# Patient Record
Sex: FEMALE | Race: WHITE | NOT HISPANIC OR LATINO | Employment: FULL TIME | URBAN - METROPOLITAN AREA
[De-identification: names, ages, dates, MRNs, and addresses within clinical notes are randomized per-mention and may not be internally consistent; named-entity substitution may affect disease eponyms.]

---

## 2023-12-05 ASSESSMENT — ENCOUNTER SYMPTOMS
NECK PAIN: 0
SHORTNESS OF BREATH: 0
POLYPHAGIA: 0
EYE DISCHARGE: 0
FEVER: 0
TROUBLE SWALLOWING: 0
FATIGUE: 0
APPETITE CHANGE: 0
COUGH: 0
DYSURIA: 0
WOUND: 0
POLYDIPSIA: 0
VOMITING: 0
BRUISES/BLEEDS EASILY: 0
CONFUSION: 0
FREQUENCY: 0
BACK PAIN: 0
EYE PAIN: 0
BLOOD IN STOOL: 0
EYE REDNESS: 0
ADENOPATHY: 0
SORE THROAT: 0
HEMATURIA: 0
UNEXPECTED WEIGHT CHANGE: 0
DIZZINESS: 0
HALLUCINATIONS: 0
HEADACHES: 0
CHILLS: 0
PALPITATIONS: 0

## 2023-12-05 NOTE — PROGRESS NOTES
Subjective   Patient ID: Eleni Watt is a 43 y.o. female who presents for Annual Exam.    new pt-kate-last pcp, gi, ob gyn, others? [facility] , dr. Ewing- gyn   Put names of providers in care team-not last pcp  last physical- 4 years ago   last labs- 2 years ago   is pt fasting? no  Does the pt want a flu shot? No   *Last tdap- atleast 10 years for her last child does not remember getting it renewed. Not today  last pap 1/9/23  last mammo- never got     What ibs med did she used to be on? Does not remember   Does she have diarrhea mostly, constipation mostly or combination? Combination     Any other questions or concerns that you want to discuss today? No     Family history form          No care team member to display    HPI  Last labs-2yrs  Due for labs- all    Cholesterol   Date Value Ref Range Status   05/31/2019 139 0 - 199 mg/dL Final     Comment:     .      AGE      DESIRABLE   BORDERLINE HIGH   HIGH     0-19 Y     0 - 169       170 - 199     >/= 200    20-24 Y     0 - 189       190 - 224     >/= 225         >24 Y     0 - 199       200 - 239     >/= 240   **All ranges are based on fasting samples. Specific   therapeutic targets will vary based on patient-specific   cardiac risk.  .   Pediatric guidelines reference:Pediatrics 2011, 128(S5).   Adult guidelines reference: NCEP ATPIII Guidelines,     JOLEEN 2001, 258:2486-97  .   Venipuncture immediately after or during the    administration of Metamizole may lead to falsely   low results. Testing should be performed immediately   prior to Metamizole dosing.       Triglycerides   Date Value Ref Range Status   05/31/2019 43 0 - 149 mg/dL Final     Comment:     .      AGE      DESIRABLE   BORDERLINE HIGH   HIGH     VERY HIGH   0 D-90 D    19 - 174         ----         ----        ----  91 D- 9 Y     0 -  74        75 -  99     >/= 100      ----    10-19 Y     0 -  89        90 - 129     >/= 130      ----    20-24 Y     0 - 114       115 - 149      ">/= 150      ----         >24 Y     0 - 149       150 - 199    200- 499    >/= 500  .   Venipuncture immediately after or during the    administration of Metamizole may lead to falsely   low results. Testing should be performed immediately   prior to Metamizole dosing.       HDL   Date Value Ref Range Status   05/31/2019 62.1 mg/dL Final     Comment:     .      AGE      VERY LOW   LOW     NORMAL    HIGH       0-19 Y       < 35   < 40     40-45     ----    20-24 Y       ----   < 40       >45     ----      >24 Y       ----   < 40     40-60      >60  .       No results found for: \"LDL\"  TSH   Date Value Ref Range Status   05/31/2019 1.59 0.44 - 3.98 mIU/L Final     Comment:      TSH testing is performed using different testing    methodology at [facility] than at other    [facility] hospitals. Direct result comparisons should    only be made within the same method.  .   Patients receiving more than 5 mg/day of biotin may have interference   in test results.  A sample should be taken no sooner than eight hours   after  previous dose. Contact [phone] for additional information.       No results found for: \"A1C\"  No components found for: \"POCA1C\"  No results found for: \"ALBUR\"  No components found for: \"POCALBUR\"      Other concerns:What ibs med did she used to be on? Does not remember   Does she have diarrhea mostly, constipation mostly or combination? Combination   Stomach sour, not sure if related to allergies- more frequent than usual x 6mon  Did see gi dr in past but declined stomach scope  Ruq abd pain  Gas  No belching or bloating or bld in stool  No vomiting    bps at home- none    ER/urgicare visits in the last year- uti  Hospitalizations in the last year- none      last Pap- 1/9/23; due 1/2028  H/o abn pap-last yr    FH ovarian, endometrial, cervical, uterine ca-none    Current birth control method-none, had twins,  got vasectomy  No change in contraception desired      last mammo- " (40-75/90) due; has order    FH br ca-none      FH colon ca-none      Exercise- walk dog, does yoga  Diet-2 meals   Body mass index is 28.56 kg/m².    last eye dr appt- glasses;  2yrs  No vision issues    last dental appt- 2mon ago    BMs-regular to diarrhea or constipation  Sleep-able to fall asleep and stay asleep; no snoring or apnea  no cp, swelling, sob, abd pain, n/v/d/c, blood in stool or black stools  STI testing including hiv (age 15-65) and hep c screening (18-79)-declines        Immunization History   Administered Date(s) Administered    Moderna SARS-CoV-2 Vaccination 04/28/2021, 05/31/2021         fractures in lifetime-none      FH heart attack, heart surgery-mat aunt, mgm, mat gr gf  FH stroke-none    The ASCVD Risk score (Arturo SOMMERS, et al., 2019) failed to calculate for the following reasons:    Cannot find a previous HDL lab    Cannot find a previous total cholesterol lab    The smoking status is invalid  Coronary Artery Calcium score:  This test is recommended for men 45 or older and women 55 or older without a history of heart disease and have 1 risk factor (high LDL cholesterol, low HDL cholesterol, high blood pressure, smoker (current or past), type 2 diabetes, IBD, lupus, RA, ankylosing spondylitis, psoriasis or family history of  heart disease <55yrs in dad, brother or child or <65yrs in mom, sister, or child.)       Review of Systems   Constitutional:  Negative for appetite change, chills, fatigue, fever and unexpected weight change.   HENT:  Negative for congestion, ear pain, sore throat and trouble swallowing.    Eyes:  Negative for pain, discharge and redness.   Respiratory:  Negative for cough and shortness of breath.    Cardiovascular:  Negative for chest pain and palpitations.   Gastrointestinal:  Positive for abdominal pain. Negative for blood in stool and vomiting.        Gassiness   Endocrine: Negative for polydipsia, polyphagia and polyuria.   Genitourinary:  Negative for dysuria,  frequency, hematuria and urgency.   Musculoskeletal:  Negative for back pain and neck pain.   Skin:  Negative for rash and wound.   Allergic/Immunologic: Negative for immunocompromised state.   Neurological:  Negative for dizziness, syncope and headaches.   Hematological:  Negative for adenopathy. Does not bruise/bleed easily.   Psychiatric/Behavioral:  Negative for confusion and hallucinations.        Objective   Visit Vitals  /66   Pulse 97   Temp 36.6 °C (97.9 °F)      BP Readings from Last 3 Encounters:   12/06/23 103/66   01/09/23 120/76   11/06/22 116/84     Wt Readings from Last 3 Encounters:   12/06/23 87.7 kg (193 lb 6.4 oz)   01/09/23 81.2 kg (179 lb)   11/06/22 79.4 kg (175 lb)           Physical Exam  Constitutional:       General: She is not in acute distress.     Appearance: Normal appearance. She is not ill-appearing.   HENT:      Head: Normocephalic.      Right Ear: Tympanic membrane, ear canal and external ear normal.      Left Ear: Tympanic membrane, ear canal and external ear normal.      Nose: Nose normal.      Mouth/Throat:      Mouth: Mucous membranes are moist.      Pharynx: Oropharynx is clear.   Eyes:      Extraocular Movements: Extraocular movements intact.      Conjunctiva/sclera: Conjunctivae normal.      Pupils: Pupils are equal, round, and reactive to light.   Cardiovascular:      Rate and Rhythm: Normal rate and regular rhythm.      Heart sounds: Normal heart sounds. No murmur heard.  Pulmonary:      Effort: Pulmonary effort is normal. No respiratory distress.      Breath sounds: Normal breath sounds. No wheezing, rhonchi or rales.   Abdominal:      General: Bowel sounds are normal.      Palpations: Abdomen is soft. There is no mass.      Tenderness: There is abdominal tenderness (ruq).   Musculoskeletal:         General: No swelling or tenderness. Normal range of motion.      Cervical back: Normal range of motion and neck supple.      Right lower leg: No edema.      Left lower  leg: No edema.   Skin:     General: Skin is warm.      Findings: No rash.   Neurological:      General: No focal deficit present.      Mental Status: She is alert and oriented to person, place, and time.      Cranial Nerves: No cranial nerve deficit.      Motor: No weakness.   Psychiatric:         Mood and Affect: Mood normal.         Behavior: Behavior normal.         Assessment/Plan   Diagnoses and all orders for this visit:  Routine general medical examination at a health care facility  -     Comprehensive Metabolic Panel; Future  -     CBC and Auto Differential; Future  -     Lipid Panel; Future  -     TSH with reflex to Free T4 if abnormal; Future  BMI 28.0-28.9,adult  Irritable bowel syndrome with both constipation and diarrhea  -     dicyclomine (Bentyl) 20 mg tablet; Take 1 tablet (20 mg) by mouth 4 times a day as needed (abdominal pain or cramps).  Food allergy  -     Food Allergy Profile IgE; Future  Right upper quadrant abdominal pain  -     US biliary system; Future  Other orders  -     Follow Up In Primary Care - Health Maintenance; Future

## 2023-12-06 ENCOUNTER — OFFICE VISIT (OUTPATIENT)
Dept: PRIMARY CARE | Facility: CLINIC | Age: 43
End: 2023-12-06
Payer: COMMERCIAL

## 2023-12-06 VITALS
SYSTOLIC BLOOD PRESSURE: 103 MMHG | OXYGEN SATURATION: 97 % | HEIGHT: 69 IN | DIASTOLIC BLOOD PRESSURE: 66 MMHG | BODY MASS INDEX: 28.64 KG/M2 | TEMPERATURE: 97.9 F | HEART RATE: 97 BPM | WEIGHT: 193.4 LBS

## 2023-12-06 DIAGNOSIS — R10.11 RIGHT UPPER QUADRANT ABDOMINAL PAIN: ICD-10-CM

## 2023-12-06 DIAGNOSIS — Z00.00 ROUTINE GENERAL MEDICAL EXAMINATION AT A HEALTH CARE FACILITY: Primary | ICD-10-CM

## 2023-12-06 DIAGNOSIS — Z91.018 FOOD ALLERGY: ICD-10-CM

## 2023-12-06 DIAGNOSIS — K58.2 IRRITABLE BOWEL SYNDROME WITH BOTH CONSTIPATION AND DIARRHEA: ICD-10-CM

## 2023-12-06 PROCEDURE — 3008F BODY MASS INDEX DOCD: CPT | Performed by: NURSE PRACTITIONER

## 2023-12-06 PROCEDURE — 1036F TOBACCO NON-USER: CPT | Performed by: NURSE PRACTITIONER

## 2023-12-06 PROCEDURE — 99386 PREV VISIT NEW AGE 40-64: CPT | Performed by: NURSE PRACTITIONER

## 2023-12-06 RX ORDER — DICYCLOMINE HYDROCHLORIDE 20 MG/1
20 TABLET ORAL 4 TIMES DAILY PRN
Qty: 120 TABLET | Refills: 5 | Status: SHIPPED | OUTPATIENT
Start: 2023-12-06 | End: 2024-03-27 | Stop reason: WASHOUT

## 2023-12-06 RX ORDER — BISMUTH SUBSALICYLATE 262 MG
1 TABLET,CHEWABLE ORAL DAILY
COMMUNITY

## 2023-12-06 ASSESSMENT — ENCOUNTER SYMPTOMS
ROS GI COMMENTS: GASSINESS
ABDOMINAL PAIN: 1

## 2023-12-06 ASSESSMENT — PATIENT HEALTH QUESTIONNAIRE - PHQ9
2. FEELING DOWN, DEPRESSED OR HOPELESS: NOT AT ALL
SUM OF ALL RESPONSES TO PHQ9 QUESTIONS 1 AND 2: 0
1. LITTLE INTEREST OR PLEASURE IN DOING THINGS: NOT AT ALL

## 2023-12-06 NOTE — PATIENT INSTRUCTIONS
See dentist        Handouts given to pt:  physical handout      I recommend signing up for MyChart.    Labs:    You can use the lab in our building when fasting. The hrs are: Monday-Friday, 7 a.m. - 5 p.m., Saturday 8 a.m. - 12 noon.   No appt needed, BUT YOU DO NEED THE PAPER ORDER.    Fasting is no food, drink, gum or mints other than water for 12 hrs.   Results will be back in 2-3 business days for most labs. It is always recommended for any orders (labs, xrays, ultrasounds,MRI, ct scan, procedures etc) to check with your insurance provider for expected costs or expenses to you.         You will get your results via phone from my medical assistant if you do not have MyChart.  OR  You will get your results via [employer]    If a result is urgent, I will call to speak to you.    Vaccines:  ---- flu vaccine-declines    ---- Tdap-declines      General recommendations:  Exercise-cardio 4-5d/wk 30min each day  Diet-Breakfast-toast (my favorite Raine Coffman Delighful Multigrain or Chucho's Killer Bread Good Seed thin-sliced)/bagel/English muffin-whole wheat flour as a 1st ingredient or cereal/oatmeal/granola bar-fiber 4g or more or protein like eggs or peanut butter; optional veggies  Lunch-protein, 1/2c carb or 2 slices bread, veg 1c  Dinner-protein, fist sized carb, veg 1c  Fruit 2 a day  Dairy 2 a day-milk, soy milk, almond milk, cheese, yogurt, cottage cheese  Snacks-Protein-hard boiled egg, nuts (walnuts/almonds/pecans/pistachios 1/4c), hummus, beef/deer jerky or meat sticks; vegetable, fruit, dairy-milk(1%, skim, almond, soy)/cheese (not a lot of cheddar)/yogurt (Greek is best-my favorite Dannon Fruit on the Bottom Greek)/cottage cheese 2%; triscuits/ popcorn/wheat thins have a lot of fiber; follow serving size on bag/box/container  increase water  Limit alcohol to 1 drink per day for women and 2 drinks per day for men (1 drink=12oz beer or 5oz wine or 1 1/2oz liquor)  Calcium: 500mg 1 twice a day if age 50 and younger and  600mg 1 twice a day if over age 50 (calcium citrate can be taken without food)  Vitamin D: 800-5000 IU/day  Limit salt to <2300mg a day if age 50 and under and <1500mg a day if over age 50/have high bp or diabetes or kidney disease  Recommend folate for childbearing age women 0.4mg per day (can be found in a multivitamin)  Recommend 18mg/dL of iron a day if age 50 and under and 8mg/dL a day if over age 50; take on an empty stomach at bedtime  Use sunscreen   Wear seatbelt  Recommend safe sex practices: using condoms everytime you have sex, discuss with a new partner about their past partners/history of STDs/drug use, avoid drinking alcohol or using drugs as this increases the chance that you will participate in high-risk sex, for oral sex help protect your mouth by having your partner use a condom (male or female), women should not douche after sex, be aware of your partner's body and your body-look for signs of a sore, blister, rash, or discharge, and have regular exams and periodic tests for STDs.  No distracted driving  No driving when under influence of substances  Wear a seatbelt  Eye dr every 1-2yrs  Dentist every 6-12 mon  Tetanus shot every 10yrs  Recommend flu vaccine in the fall  Appt in 1 year for physical      I will communicate with you via [employer] regarding messages and results. If you need help with this, you can call the support line at [phone].    IT WAS A PLEASURE TO SEE YOU TODAY. THANK YOU FOR CHOOSING US FOR YOUR HEALTHCARE NEEDS.

## 2023-12-11 ENCOUNTER — LAB (OUTPATIENT)
Dept: LAB | Facility: LAB | Age: 43
End: 2023-12-11
Payer: COMMERCIAL

## 2023-12-11 DIAGNOSIS — Z91.018 FOOD ALLERGY: ICD-10-CM

## 2023-12-11 DIAGNOSIS — Z00.00 ROUTINE GENERAL MEDICAL EXAMINATION AT A HEALTH CARE FACILITY: ICD-10-CM

## 2023-12-11 LAB
ALBUMIN SERPL BCP-MCNC: 4.2 G/DL (ref 3.4–5)
ALP SERPL-CCNC: 40 U/L (ref 33–110)
ALT SERPL W P-5'-P-CCNC: 23 U/L (ref 7–45)
ANION GAP SERPL CALC-SCNC: 11 MMOL/L (ref 10–20)
AST SERPL W P-5'-P-CCNC: 15 U/L (ref 9–39)
BASOPHILS # BLD AUTO: 0.04 X10*3/UL (ref 0–0.1)
BASOPHILS NFR BLD AUTO: 0.7 %
BILIRUB SERPL-MCNC: 0.4 MG/DL (ref 0–1.2)
BUN SERPL-MCNC: 11 MG/DL (ref 6–23)
CALCIUM SERPL-MCNC: 9 MG/DL (ref 8.6–10.6)
CHLORIDE SERPL-SCNC: 105 MMOL/L (ref 98–107)
CHOLEST SERPL-MCNC: 143 MG/DL (ref 0–199)
CHOLESTEROL/HDL RATIO: 2.6
CO2 SERPL-SCNC: 27 MMOL/L (ref 21–32)
CREAT SERPL-MCNC: 0.91 MG/DL (ref 0.5–1.05)
EOSINOPHIL # BLD AUTO: 0.05 X10*3/UL (ref 0–0.7)
EOSINOPHIL NFR BLD AUTO: 0.8 %
ERYTHROCYTE [DISTWIDTH] IN BLOOD BY AUTOMATED COUNT: 11.6 % (ref 11.5–14.5)
GFR SERPL CREATININE-BSD FRML MDRD: 81 ML/MIN/1.73M*2
GLUCOSE SERPL-MCNC: 83 MG/DL (ref 74–99)
HCT VFR BLD AUTO: 39.8 % (ref 36–46)
HDLC SERPL-MCNC: 54.3 MG/DL
HGB BLD-MCNC: 13.5 G/DL (ref 12–16)
IMM GRANULOCYTES # BLD AUTO: 0.01 X10*3/UL (ref 0–0.7)
IMM GRANULOCYTES NFR BLD AUTO: 0.2 % (ref 0–0.9)
LDLC SERPL CALC-MCNC: 72 MG/DL
LYMPHOCYTES # BLD AUTO: 2.05 X10*3/UL (ref 1.2–4.8)
LYMPHOCYTES NFR BLD AUTO: 34.6 %
MCH RBC QN AUTO: 29.9 PG (ref 26–34)
MCHC RBC AUTO-ENTMCNC: 33.9 G/DL (ref 32–36)
MCV RBC AUTO: 88 FL (ref 80–100)
MONOCYTES # BLD AUTO: 0.47 X10*3/UL (ref 0.1–1)
MONOCYTES NFR BLD AUTO: 7.9 %
NEUTROPHILS # BLD AUTO: 3.3 X10*3/UL (ref 1.2–7.7)
NEUTROPHILS NFR BLD AUTO: 55.8 %
NON HDL CHOLESTEROL: 89 MG/DL (ref 0–149)
NRBC BLD-RTO: 0 /100 WBCS (ref 0–0)
PLATELET # BLD AUTO: 303 X10*3/UL (ref 150–450)
POTASSIUM SERPL-SCNC: 4.1 MMOL/L (ref 3.5–5.3)
PROT SERPL-MCNC: 6.8 G/DL (ref 6.4–8.2)
RBC # BLD AUTO: 4.52 X10*6/UL (ref 4–5.2)
SODIUM SERPL-SCNC: 139 MMOL/L (ref 136–145)
TRIGL SERPL-MCNC: 82 MG/DL (ref 0–149)
TSH SERPL-ACNC: 1.91 MIU/L (ref 0.44–3.98)
VLDL: 16 MG/DL (ref 0–40)
WBC # BLD AUTO: 5.9 X10*3/UL (ref 4.4–11.3)

## 2023-12-11 PROCEDURE — 84443 ASSAY THYROID STIM HORMONE: CPT

## 2023-12-11 PROCEDURE — 80053 COMPREHEN METABOLIC PANEL: CPT

## 2023-12-11 PROCEDURE — 80061 LIPID PANEL: CPT

## 2023-12-11 PROCEDURE — 36415 COLL VENOUS BLD VENIPUNCTURE: CPT

## 2023-12-11 PROCEDURE — 86003 ALLG SPEC IGE CRUDE XTRC EA: CPT

## 2023-12-11 PROCEDURE — 85025 COMPLETE CBC W/AUTO DIFF WBC: CPT

## 2023-12-12 ENCOUNTER — TELEPHONE (OUTPATIENT)
Dept: PRIMARY CARE | Facility: CLINIC | Age: 43
End: 2023-12-12
Payer: COMMERCIAL

## 2023-12-12 ENCOUNTER — ANCILLARY PROCEDURE (OUTPATIENT)
Dept: RADIOLOGY | Facility: CLINIC | Age: 43
End: 2023-12-12
Payer: COMMERCIAL

## 2023-12-12 DIAGNOSIS — R10.11 RIGHT UPPER QUADRANT PAIN: ICD-10-CM

## 2023-12-12 LAB
CLAM IGE QN: <0.1 KU/L
CODFISH IGE QN: <0.1 KU/L
CORN IGE QN: <0.1
EGG WHITE IGE QN: <0.1 KU/L
MILK IGE QN: <0.1 KU/L
PEANUT IGE QN: <0.1 KU/L
SCALLOP IGE QN: <0.1 KU/L
SESAME SEED IGE QN: <0.1 KU/L
SHRIMP IGE QN: <0.1 KU/L
SOYBEAN IGE QN: <0.1 KU/L
WALNUT IGE QN: <0.1 KU/L
WHEAT IGE QN: <0.1 KU/L

## 2023-12-12 PROCEDURE — 76705 ECHO EXAM OF ABDOMEN: CPT | Performed by: STUDENT IN AN ORGANIZED HEALTH CARE EDUCATION/TRAINING PROGRAM

## 2023-12-12 PROCEDURE — 76705 ECHO EXAM OF ABDOMEN: CPT

## 2023-12-12 NOTE — TELEPHONE ENCOUNTER
----- Message from MILTON Joe-CNP sent at 12/12/2023  9:54 AM EST -----  Sugar (aka glucose), kidney function, liver function and electrolytes in the CMP (comprehensive metabolic panel) were normal.  All cholesterol labs normal.  TSH (thyroid test) was normal.  CBC (complete blood count) was normal which looks at infection and anemia markers.

## 2023-12-18 ENCOUNTER — ANCILLARY PROCEDURE (OUTPATIENT)
Dept: RADIOLOGY | Facility: CLINIC | Age: 43
End: 2023-12-18
Payer: COMMERCIAL

## 2023-12-18 DIAGNOSIS — Z12.31 ENCOUNTER FOR SCREENING MAMMOGRAM FOR MALIGNANT NEOPLASM OF BREAST: ICD-10-CM

## 2023-12-18 PROCEDURE — 77067 SCR MAMMO BI INCL CAD: CPT | Mod: BILATERAL PROCEDURE | Performed by: RADIOLOGY

## 2023-12-18 PROCEDURE — 77063 BREAST TOMOSYNTHESIS BI: CPT | Mod: BILATERAL PROCEDURE | Performed by: RADIOLOGY

## 2023-12-18 PROCEDURE — 77067 SCR MAMMO BI INCL CAD: CPT

## 2024-01-16 ENCOUNTER — APPOINTMENT (OUTPATIENT)
Dept: OBSTETRICS AND GYNECOLOGY | Facility: CLINIC | Age: 44
End: 2024-01-16

## 2024-03-27 ENCOUNTER — OFFICE VISIT (OUTPATIENT)
Dept: OBSTETRICS AND GYNECOLOGY | Facility: CLINIC | Age: 44
End: 2024-03-27
Payer: COMMERCIAL

## 2024-03-27 VITALS
BODY MASS INDEX: 28.14 KG/M2 | SYSTOLIC BLOOD PRESSURE: 116 MMHG | HEIGHT: 69 IN | WEIGHT: 190 LBS | DIASTOLIC BLOOD PRESSURE: 78 MMHG

## 2024-03-27 DIAGNOSIS — Z12.31 ENCOUNTER FOR SCREENING MAMMOGRAM FOR BREAST CANCER: ICD-10-CM

## 2024-03-27 DIAGNOSIS — Z01.419 WELL WOMAN EXAM: ICD-10-CM

## 2024-03-27 DIAGNOSIS — N76.2 ACUTE VULVITIS: Primary | ICD-10-CM

## 2024-03-27 PROCEDURE — 3008F BODY MASS INDEX DOCD: CPT | Performed by: OBSTETRICS & GYNECOLOGY

## 2024-03-27 PROCEDURE — 1036F TOBACCO NON-USER: CPT | Performed by: OBSTETRICS & GYNECOLOGY

## 2024-03-27 PROCEDURE — 88175 CYTOPATH C/V AUTO FLUID REDO: CPT

## 2024-03-27 PROCEDURE — 87624 HPV HI-RISK TYP POOLED RSLT: CPT

## 2024-03-27 PROCEDURE — 99396 PREV VISIT EST AGE 40-64: CPT | Performed by: OBSTETRICS & GYNECOLOGY

## 2024-03-27 RX ORDER — CLOTRIMAZOLE AND BETAMETHASONE DIPROPIONATE 10; .64 MG/G; MG/G
1 CREAM TOPICAL 2 TIMES DAILY
Qty: 30 G | Refills: 0 | Status: SHIPPED | OUTPATIENT
Start: 2024-03-27 | End: 2024-05-26

## 2024-03-27 ASSESSMENT — PAIN SCALES - GENERAL: PAINLEVEL: 0-NO PAIN

## 2024-03-27 NOTE — PROGRESS NOTES
"Chief Complaint   Patient presents with    Well Women Visit     PT is here for Annual.     Patient presents for annual preventative exam.  Overall doing well.  Over the last week noticed a \"sore\" left lower labia.  Has been with a history of HPV and wants to be sure everything here is fine.  Children are 12, 11, 11.    REVIEW OF SYSTEMS    Please see HPI for reported pertinent positives, which would supersede this ROS    Constitutional:  Denies fever, chills, wt gain or loss, fatigue    Genito-Urinary:  Denies genital lesion or sores, vaginal dryness, itching  or pain.  No abnormal vaginal discharge or unexplained vaginal bleeding.  No dysuria, urinary incontinence or frequency.  Denies pelvic pain, dysmenorrhea or dyspareunia.    Eyes:  Denies vision changes, dryness  ENT:  No hearing loss, sinus pain or congestion, nosebleeds  Cardiovascular:  No chest pain or palpitations  Respiratory:  No SOB, cough, wheezing  GI:  No Nausea, vomiting, diarrhea, constipation, abdominal pain  Musculoskeletal:  No new back pain. joint pain, peripheral edema  Skin:  No rash or skin lesion  Neurologic:  No HA, numbness or dizziness  Psychiatric:  No new anxiety or depression  Endocrine:  No loss of hair or hirsutism  Hematologic/lymphatic:  No swollen lymph nodes.  No reported tendency for easy bruising or bleeding    Patient admits to no other systemic complaints      Vitals:    03/27/24 1044   BP: 116/78       PHYSICAL EXAM:    PSYCH:  Pt is alert, oriented and cooperative    SKIN: warm, dry, w/o lesion    HEAD AND FACE:  External inspection of eyes, ears, functional cranial nerves normal and intact    THYROID:  No thyromegaly    CARDIOVASCULAR:  Warm and well Perfused    PULMONARY:  No respiratory distress    ABDOMEN:  soft, nontender.  No mass or organomegaly.      BREAST:  Breasts are symmetric to inspection and palpation.  No mass palpable bilaterally.  There is no axillary lymphadenopathy    PELVIC:    External genitalia, " urethra, perianal region normal to inspection and palpation if indicated.  No inguinal LA    Lower left confluence of the minora just left of perineal body there is a 8 x 10 mm area of inflamed tissue.  This is bright pink with a small fissure.  No evidence of condyloma or HSV.  Suspect localized inflammation, possibly due to contact sensitivity, possible yeast.    Vagina without lesions.    Cervix seen and visually normal      Bimanual exam:      No pelvic mass palpable    Uterus nontender, midline in pelvis    No adnexal masses or tenderness    Assessment/Plan    Diagnoses and all orders for this visit:  Well woman exam  -     THINPREP PAP TEST  Encounter for screening mammogram for breast cancer  Localized vulvitis.  Lotrisone cream topically twice daily for 1 to 2 weeks.  Let me know if not resolved.

## 2024-04-01 ENCOUNTER — HOSPITAL ENCOUNTER (OUTPATIENT)
Dept: RADIOLOGY | Facility: EXTERNAL LOCATION | Age: 44
Discharge: HOME | End: 2024-04-01

## 2024-04-01 ENCOUNTER — OFFICE VISIT (OUTPATIENT)
Dept: URGENT CARE | Facility: CLINIC | Age: 44
End: 2024-04-01
Payer: COMMERCIAL

## 2024-04-01 VITALS
HEART RATE: 85 BPM | RESPIRATION RATE: 18 BRPM | BODY MASS INDEX: 28.06 KG/M2 | DIASTOLIC BLOOD PRESSURE: 80 MMHG | TEMPERATURE: 97.9 F | SYSTOLIC BLOOD PRESSURE: 112 MMHG | WEIGHT: 190 LBS | OXYGEN SATURATION: 97 %

## 2024-04-01 DIAGNOSIS — S89.91XA LOWER EXTREMITY INJURY, RIGHT, INITIAL ENCOUNTER: Primary | ICD-10-CM

## 2024-04-01 DIAGNOSIS — S82.64XA CLOSED NONDISPLACED FRACTURE OF LATERAL MALLEOLUS OF RIGHT FIBULA, INITIAL ENCOUNTER: ICD-10-CM

## 2024-04-01 DIAGNOSIS — S89.91XA LOWER EXTREMITY INJURY, RIGHT, INITIAL ENCOUNTER: ICD-10-CM

## 2024-04-01 PROCEDURE — 3008F BODY MASS INDEX DOCD: CPT | Performed by: PHYSICIAN ASSISTANT

## 2024-04-01 PROCEDURE — 99204 OFFICE O/P NEW MOD 45 MIN: CPT | Performed by: PHYSICIAN ASSISTANT

## 2024-04-01 PROCEDURE — 1036F TOBACCO NON-USER: CPT | Performed by: PHYSICIAN ASSISTANT

## 2024-04-01 ASSESSMENT — ENCOUNTER SYMPTOMS
ENDOCRINE NEGATIVE: 1
GASTROINTESTINAL NEGATIVE: 1
CONSTITUTIONAL NEGATIVE: 1
ARTHRALGIAS: 1
PSYCHIATRIC NEGATIVE: 1
CARDIOVASCULAR NEGATIVE: 1
NEUROLOGICAL NEGATIVE: 1
ALLERGIC/IMMUNOLOGIC NEGATIVE: 1
HEMATOLOGIC/LYMPHATIC NEGATIVE: 1
EYES NEGATIVE: 1
RESPIRATORY NEGATIVE: 1

## 2024-04-01 NOTE — PATIENT INSTRUCTIONS
Use your walker boot till seen by orthopedics  Motrin or aleve as directed  Ice and elevate ankle 2-3 times a day  Orthopedic follow up this week

## 2024-04-01 NOTE — PROGRESS NOTES
Subjective   Patient ID: Eleni Watt is a 44 y.o. female.      History provided by:  Patient   used: No    Ankle Injury    This is a 44 yr old female here for right foot and ankle injury 2 days ago. Rolled the right LE working out in yard. No parasthesias or open wound.     Review of Systems   Constitutional: Negative.    HENT: Negative.     Eyes: Negative.    Respiratory: Negative.     Cardiovascular: Negative.    Gastrointestinal: Negative.    Endocrine: Negative.    Genitourinary: Negative.    Musculoskeletal:  Positive for arthralgias.   Skin: Negative.    Allergic/Immunologic: Negative.    Neurological: Negative.    Hematological: Negative.    Psychiatric/Behavioral: Negative.     All other systems reviewed and are negative.  /80   Pulse 85   Temp 36.6 °C (97.9 °F)   Resp 18   Wt 86.2 kg (190 lb)   LMP 01/16/2024   SpO2 97%   BMI 28.06 kg/m²     Objective   Physical Exam  Vitals and nursing note reviewed.   Constitutional:       Appearance: Normal appearance.   HENT:      Head: Normocephalic and atraumatic.   Cardiovascular:      Rate and Rhythm: Normal rate and regular rhythm.   Pulmonary:      Effort: Pulmonary effort is normal.      Breath sounds: Normal breath sounds.   Musculoskeletal:      Comments: Right LE-lateral pain with palpation over malleolus, ecchymosis and edema present into foot, limited FROM, distal n-v intact   Skin:     General: Skin is warm and dry.   Neurological:      General: No focal deficit present.      Mental Status: She is alert and oriented to person, place, and time.   Psychiatric:         Mood and Affect: Mood normal.         Behavior: Behavior normal.     Assessment:  Distal fibula fracture right LE    Plan:  Right foot/ankle xray-distal fibula fx  Has walking boot to use till seen by orthopedics  OTC NSAID as directed  Ice and elevate ankle/foot 2-3 times a day  Orthopedic follow up this week  ER visit anytime 24/7 for acute worsening or  changing condition

## 2024-04-02 ENCOUNTER — HOSPITAL ENCOUNTER (OUTPATIENT)
Dept: RADIOLOGY | Facility: HOSPITAL | Age: 44
Discharge: HOME | End: 2024-04-02
Payer: COMMERCIAL

## 2024-04-02 ENCOUNTER — OFFICE VISIT (OUTPATIENT)
Dept: ORTHOPEDIC SURGERY | Facility: HOSPITAL | Age: 44
End: 2024-04-02
Payer: COMMERCIAL

## 2024-04-02 VITALS — WEIGHT: 190 LBS | HEIGHT: 69 IN | BODY MASS INDEX: 28.14 KG/M2

## 2024-04-02 DIAGNOSIS — S82.891A ANKLE FRACTURE, RIGHT, CLOSED, INITIAL ENCOUNTER: ICD-10-CM

## 2024-04-02 DIAGNOSIS — S82.61XA CLOSED FRACTURE OF DISTAL LATERAL MALLEOLUS OF ANKLE, RIGHT, INITIAL ENCOUNTER: Primary | ICD-10-CM

## 2024-04-02 PROCEDURE — 27786 TREATMENT OF ANKLE FRACTURE: CPT | Performed by: ORTHOPAEDIC SURGERY

## 2024-04-02 PROCEDURE — 73590 X-RAY EXAM OF LOWER LEG: CPT | Mod: RIGHT SIDE | Performed by: RADIOLOGY

## 2024-04-02 PROCEDURE — 73590 X-RAY EXAM OF LOWER LEG: CPT | Mod: RT

## 2024-04-02 PROCEDURE — 99203 OFFICE O/P NEW LOW 30 MIN: CPT | Performed by: ORTHOPAEDIC SURGERY

## 2024-04-02 PROCEDURE — 99213 OFFICE O/P EST LOW 20 MIN: CPT | Mod: 57,25 | Performed by: ORTHOPAEDIC SURGERY

## 2024-04-02 PROCEDURE — 3008F BODY MASS INDEX DOCD: CPT | Performed by: ORTHOPAEDIC SURGERY

## 2024-04-02 PROCEDURE — 1036F TOBACCO NON-USER: CPT | Performed by: ORTHOPAEDIC SURGERY

## 2024-04-02 ASSESSMENT — PAIN DESCRIPTION - DESCRIPTORS: DESCRIPTORS: SORE

## 2024-04-02 ASSESSMENT — PAIN SCALES - GENERAL: PAINLEVEL_OUTOF10: 4

## 2024-04-02 ASSESSMENT — PAIN - FUNCTIONAL ASSESSMENT: PAIN_FUNCTIONAL_ASSESSMENT: 0-10

## 2024-04-02 NOTE — PROGRESS NOTES
ORTHOPAEDIC HISTORY AND PHYSICAL    History Of Present Illness  Orthopaedic Problems/Injuries:  Eleni Watt is a 44 y.o. female presenting with right ankle fracture.  She reported last Saturday she tripped landing awkwardly and twisting her ankle severely.  She had immediate pain and swelling.  She was in the emergency room and noted to have a fracture of her ankle.  Patient but cam boot from [facility] and is back to working.  She is able to ambulate in the boot.  She have pain and swelling and ecchymosis on the ankle and foot.  Pain is moderate.  The pain is worse with movement and better with rest.  She rates her pain as 6 out of 10.  No numbness or tingling the foot.      Review of Systems: 12 point ROS negative unless stated in HPI    Past Medical History  She has a past medical history of Gastro-esophageal reflux disease without esophagitis (10/23/2019).    Surgical History  She has a past surgical history that includes Other surgical history (10/23/2019).     Social History  She reports that she quit smoking about 13 years ago. Her smoking use included cigarettes. She has never used smokeless tobacco. She reports that she does not drink alcohol and does not use drugs.    Family History  Family History   Problem Relation Name Age of Onset    Heart disease Maternal Grandmother          Allergies  Sulfamethoxazole-trimethoprim    Review of Systems     Physical Exam  Gen: The patient is alert and oriented ×3, is in no acute distress, and appear their stated age and weight.    Psychiatric: Mood and affect are appropriate.    Eyes: Sclera are white, and pupils are round and symmetric.    ENT: Mucous membranes are moist.     Neck: Supple. Thyroid is midline.    Respiratory: Respirations are nonlabored, chest rise is symmetric.    Cardiac: Rate is regular by palpation of distal pulses.     Abdomen: Nondistended.    Integument: No obvious cutaneous lesions are noted. No signs of lymphangitis. No signs of systemic  edema.    Examination right ankle was performed.  There was swelling, ecchymosis and bruising over the lateral malleolus.  The skin and neurovascular examination of the foot was intact. The neurological exam including motor and sensory exam was performed. The vascular examination including palpation of pulses and capillary refill of the foot was performed and determined to be intact.   There was tenderness to palpation over the lateral malleolus.  There was no gross instability of the foot or ankle.  There was no lymphangitis.  Subtalar and midtarsal motion was intact.  Jimenez test was negative for Achilles tendon rupture.  Calf was soft, nontender and there were no palpable cords.        Relevant Results  I personally reviewed right ankle radiograph that reveals a distal fibula avulsion fracture.  Alignment is well-maintained with some displacement.  Ankle mortise is maintained.    Assessment/Plan   Patient sustained a Woodard a lateral malleolus ankle fracture.  Good alignment.  We discussed treatment option.  I recommended close treatment of her fracture.  We discussed risk of malunion and injury to the articular surface of the talus with this fracture pattern.  Patient may be weightbearing as tolerated in the cam boot.  The boot that she purchased was a very short boot essentially stopped supramalleolar.  I showed her a tall cam boot and recommended that she purchase his boot.  I recommend using the boot for at least 6 weeks.  I gave her the option to go to physical therapy however she declined.  I demonstrated home exercises to her and regimen/protocol.  Patient will come back and see me in about 2 to 3 months for repeat radiographs to ensure that the fracture is healed.  She may take over-the-counter anti-inflammatories as needed.

## 2024-08-20 ASSESSMENT — ENCOUNTER SYMPTOMS
SHORTNESS OF BREATH: 0
WHEEZING: 0
CONSTITUTIONAL NEGATIVE: 1

## 2024-08-20 NOTE — PROGRESS NOTES
Subjective   Patient ID: Eleni Watt is a 44 y.o. female who presents for UTI.  Last physical: 12/6/23; needs to schedule for next appt  Last labs-12/2023  Sugar (aka glucose), kidney function, liver function and electrolytes in the CMP (comprehensive metabolic panel) were normal.  All cholesterol labs normal.  TSH (thyroid test) was normal.  CBC (complete blood count) was normal which looks at infection and anemia markers.  Food allergy tests are normal.  I can refer you to an allergist or gi dr if you would like to have further evaluation. Let me know.    When did uti sx start? 3 days ago   What are her symptoms? Pressure, irritation when urinating and after, urgency, frequency.   Any other questions or concerns that pt wants to discuss today? No     Poc ua, send culture    HPI  uti symptoms for 3d  Lower abd pressure, irritation when urinating and after, urgency, frequency.   No hematuria,  lower back pain, fever, chills, cloudy urine, odor to urine, small amount of urine when urinate, nausea, vomiting, incontinence  no itching, skin feels like it is burning, redness, rash, thick (thin or watery) vaginal d/c, irritation of genital area, swelling of genital area, painful IC  no fishy vaginal odor especially after IC or thin whitish-gray vaginal d/c  Selftxt: water      No care team member to display     Review of Systems   Constitutional: Negative.    Respiratory:  Negative for shortness of breath and wheezing.    Cardiovascular:  Negative for chest pain.       Objective   Visit Vitals  /75   Pulse 76   Temp 36.1 °C (96.9 °F)      BP Readings from Last 3 Encounters:   08/21/24 106/75   04/01/24 112/80   03/27/24 116/78     Wt Readings from Last 3 Encounters:   08/21/24 85.9 kg (189 lb 6.4 oz)   04/02/24 86.2 kg (190 lb)   04/01/24 86.2 kg (190 lb)       Physical Exam  Constitutional:       General: She is not in acute distress.     Appearance: Normal appearance.   Neurological:      Mental Status: She  is alert.       Assessment/Plan   Diagnoses and all orders for this visit:  Urinary urgency  -     Urine Culture; Future  -     POCT UA Automated manually resulted; Future  -     nitrofurantoin, macrocrystal-monohydrate, (Macrobid) 100 mg capsule; Take 1 capsule (100 mg) by mouth 2 times a day for 7 days.  Other orders  -     Follow Up In Primary Care - Health Maintenance; Future

## 2024-08-21 ENCOUNTER — OFFICE VISIT (OUTPATIENT)
Dept: PRIMARY CARE | Facility: CLINIC | Age: 44
End: 2024-08-21
Payer: COMMERCIAL

## 2024-08-21 VITALS
WEIGHT: 189.4 LBS | SYSTOLIC BLOOD PRESSURE: 106 MMHG | HEIGHT: 69 IN | BODY MASS INDEX: 28.05 KG/M2 | TEMPERATURE: 96.9 F | OXYGEN SATURATION: 97 % | DIASTOLIC BLOOD PRESSURE: 75 MMHG | HEART RATE: 76 BPM

## 2024-08-21 DIAGNOSIS — R39.15 URINARY URGENCY: Primary | ICD-10-CM

## 2024-08-21 LAB
POC APPEARANCE, URINE: CLEAR
POC BILIRUBIN, URINE: NEGATIVE
POC BLOOD, URINE: NEGATIVE
POC COLOR, URINE: YELLOW
POC GLUCOSE, URINE: NEGATIVE MG/DL
POC KETONES, URINE: NEGATIVE MG/DL
POC LEUKOCYTES, URINE: ABNORMAL
POC NITRITE,URINE: NEGATIVE
POC PH, URINE: 6 PH
POC PROTEIN, URINE: NEGATIVE MG/DL
POC SPECIFIC GRAVITY, URINE: 1.01
POC UROBILINOGEN, URINE: 0.2 EU/DL

## 2024-08-21 PROCEDURE — 99213 OFFICE O/P EST LOW 20 MIN: CPT | Performed by: NURSE PRACTITIONER

## 2024-08-21 PROCEDURE — 1036F TOBACCO NON-USER: CPT | Performed by: NURSE PRACTITIONER

## 2024-08-21 PROCEDURE — 81003 URINALYSIS AUTO W/O SCOPE: CPT | Performed by: NURSE PRACTITIONER

## 2024-08-21 PROCEDURE — 3008F BODY MASS INDEX DOCD: CPT | Performed by: NURSE PRACTITIONER

## 2024-08-21 PROCEDURE — 87086 URINE CULTURE/COLONY COUNT: CPT

## 2024-08-21 RX ORDER — NITROFURANTOIN 25; 75 MG/1; MG/1
100 CAPSULE ORAL 2 TIMES DAILY
Qty: 14 CAPSULE | Refills: 0 | Status: SHIPPED | OUTPATIENT
Start: 2024-08-21 | End: 2024-08-28

## 2024-08-21 ASSESSMENT — PATIENT HEALTH QUESTIONNAIRE - PHQ9
2. FEELING DOWN, DEPRESSED OR HOPELESS: NOT AT ALL
1. LITTLE INTEREST OR PLEASURE IN DOING THINGS: NOT AT ALL
SUM OF ALL RESPONSES TO PHQ9 QUESTIONS 1 AND 2: 0

## 2024-08-21 NOTE — PATIENT INSTRUCTIONS
urine culture result back in 2-3d  push fluids; can try cranberry juice  avoid drinks that irritate the bladder like coffee, alcohol, and soft drinks containing citrus juices or caffeine until your infection has cleared.   rest  tylenol  ibuprofen  heating pad on abdomen  start antibiotic; you can stop the antibiotic if the culture comes back negative/normal.  call if sx worsen or change especially fever, chills, or back pain or not starting to get better in 2-3d        I will communicate with you via [email or web address] regarding messages and results. If you need help with this, you can call the support line at [phone].    IT WAS A PLEASURE TO SEE YOU TODAY. THANK YOU FOR CHOOSING US FOR YOUR HEALTHCARE NEEDS.

## 2024-08-22 LAB — BACTERIA UR CULT: NORMAL

## 2024-08-23 DIAGNOSIS — R39.15 URINARY URGENCY: Primary | ICD-10-CM

## 2024-11-08 ENCOUNTER — OFFICE VISIT (OUTPATIENT)
Dept: ORTHOPEDIC SURGERY | Facility: CLINIC | Age: 44
End: 2024-11-08
Payer: COMMERCIAL

## 2024-11-08 ENCOUNTER — HOSPITAL ENCOUNTER (OUTPATIENT)
Dept: RADIOLOGY | Facility: CLINIC | Age: 44
Discharge: HOME | End: 2024-11-08
Payer: COMMERCIAL

## 2024-11-08 VITALS — WEIGHT: 180 LBS | BODY MASS INDEX: 26.66 KG/M2 | HEIGHT: 69 IN

## 2024-11-08 DIAGNOSIS — M72.2 PLANTAR FASCIITIS OF RIGHT FOOT: Primary | ICD-10-CM

## 2024-11-08 DIAGNOSIS — M25.571 ACUTE RIGHT ANKLE PAIN: ICD-10-CM

## 2024-11-08 PROCEDURE — 73610 X-RAY EXAM OF ANKLE: CPT | Mod: RT

## 2024-11-08 PROCEDURE — 99203 OFFICE O/P NEW LOW 30 MIN: CPT

## 2024-11-08 PROCEDURE — 99213 OFFICE O/P EST LOW 20 MIN: CPT

## 2024-11-08 PROCEDURE — 3008F BODY MASS INDEX DOCD: CPT

## 2024-11-08 PROCEDURE — 1036F TOBACCO NON-USER: CPT

## 2024-11-08 RX ORDER — METHYLPREDNISOLONE 4 MG/1
TABLET ORAL
Qty: 21 TABLET | Refills: 0 | Status: SHIPPED | OUTPATIENT
Start: 2024-11-08

## 2024-11-08 NOTE — PROGRESS NOTES
Subjective    Patient ID: Eleni Watt is a 44 y.o. female.    Chief Complaint: Follow-up of the Right Ankle (FUV for ankle fx)    HPI  This is a pleasant 44-year-old female presenting to the office for evaluation of continued right ankle pain.  Patient explains that she initially injured her right ankle in March 2024, when she sustained a distal fibula fracture, which was treated nonoperatively.  States that she wore walking boot for approximately 3 weeks and then transition into a lace up ankle brace.  States that she was recovering well when unfortunately 2 months ago she again rolled her right ankle.  States that pain returned in her ankle, but was more focal to the medial aspect of her right foot and ankle.  Over the last few weeks, she has noticed medial ankle pain and swelling as well as pain to the medial dorsal and plantar aspect of her foot.  She has been using an Ace wrap, taking NSAIDs and applying ice with some relief of symptoms.  Pain is worse with any prolonged walking standing or stair climbing.  Patient does state that she has some degree of plantar fasciitis.  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    The patient's past medical, surgical, family, and social history as well as allergies and medications were reviewed and updated in the chart.    Objective   Ortho Exam  Pleasant in no acute distress.  Walks in the office today with a normal gait, no use of assistive device.  Right foot and ankle are appearing without soft tissue swelling erythema or ecchymosis.  There is no warmth upon touch and skin is intact.  She is minimally tender upon palpation of medial dorsal and plantar foot.  No specific tenderness upon palpation of plantar fascia.  No tenderness to medial or lateral malleolus.  Minimal tenderness to deltoid ligament.  She has full range of motion of her right foot and ankle with minimal pain elicited.  She has adequate strength with resisted dorsiflexion  and plantarflexion as well as inversion and eversion.  The ankle joint is stable, no ligament laxity.  Sensation is intact to light touch.    Image Results:  XR tibia fibula right 2 views  Narrative: Interpreted By:  Stew Frederick  and Justus Vogt   STUDY:  Right tibia, two views      INDICATION:  Signs/Symptoms:distal fibulat fx.      COMPARISON:  None.      ACCESSION NUMBER(S):  [id number]      ORDERING CLINICIAN:  ОЛЬГА FOWLER      FINDINGS:  Acute minimally displaced avulsion fracture of the lateral malleolus  distal tip. The ankle mortise is maintained.  No significant degenerative changes.  No ankle joint effusion.  No radiopaque retained foreign body or soft tissue gas.      Impression: Acute minimally displaced avulsion fracture of the lateral malleolus  distal tip.      I personally reviewed the image(s)/study and resident interpretation.  I agree with the findings as stated by resident Sin Jerome.  Data analyzed and images interpreted at [facility], [place], [place].      MACRO:  None.      Signed by: Stew Frederick 4/3/2024 9:59 AM  Dictation workstation:   [id number]    Multiple view x-rays of the right ankle obtained today personally reviewed, with evidence of a remote injury to distal tip of lateral malleolus.  There is no acute fracture or dislocation noted.  Ankle mortise is intact on the pain.    Assessment/Plan   Encounter Diagnoses:  Plantar fasciitis of right foot    Acute right ankle pain    Plan: Discussion with patient in regards to recurrent ankle sprains in continued right foot and ankle pain, mostly focal to the medial side.  Conservative treatment options were discussed at length.  I did advise that patient use a lace up ankle brace or Ace wrap to help with ankle stability and comfort.  She will benefit from a formal physical therapy program to help strengthen right ankle referral provided.  I did prescribe patient a Medrol Dosepak  to help decrease pain and inflammation of right foot and ankle.  If she does not see significant relief of symptoms following conservative treatment options, referral to a orthopedic foot and ankle specialist would be indicated.  She can follow-up as needed.    Orders Placed This Encounter    XR ankle right 3+ views    Referral to Physical Therapy    methylPREDNISolone (Medrol Dospak) 4 mg tablets

## 2024-12-04 ENCOUNTER — APPOINTMENT (OUTPATIENT)
Dept: PHYSICAL THERAPY | Facility: CLINIC | Age: 44
End: 2024-12-04
Payer: COMMERCIAL

## 2024-12-09 ENCOUNTER — OFFICE VISIT (OUTPATIENT)
Dept: URGENT CARE | Age: 44
End: 2024-12-09
Payer: COMMERCIAL

## 2024-12-09 VITALS
HEART RATE: 90 BPM | DIASTOLIC BLOOD PRESSURE: 72 MMHG | BODY MASS INDEX: 27.32 KG/M2 | OXYGEN SATURATION: 100 % | SYSTOLIC BLOOD PRESSURE: 100 MMHG | RESPIRATION RATE: 18 BRPM | WEIGHT: 185 LBS | TEMPERATURE: 97.2 F

## 2024-12-09 DIAGNOSIS — J06.9 UPPER RESPIRATORY TRACT INFECTION, UNSPECIFIED TYPE: Primary | ICD-10-CM

## 2024-12-09 PROCEDURE — 99213 OFFICE O/P EST LOW 20 MIN: CPT | Performed by: FAMILY MEDICINE

## 2024-12-09 PROCEDURE — 1036F TOBACCO NON-USER: CPT | Performed by: FAMILY MEDICINE

## 2024-12-09 ASSESSMENT — ENCOUNTER SYMPTOMS
SINUS PAIN: 0
EYE PAIN: 0
WHEEZING: 1
SHORTNESS OF BREATH: 1
CHILLS: 0
DIAPHORESIS: 1
SINUS PRESSURE: 0
EYE DISCHARGE: 0
SORE THROAT: 1
CHEST TIGHTNESS: 1
FEVER: 0
EYE REDNESS: 0
HEADACHES: 1
COUGH: 1

## 2024-12-09 NOTE — PROGRESS NOTES
Subjective   Patient ID: Eleni Watt is a 44 y.o. female. They present today with a chief complaint of Cough (X 1 week).    History of Present Illness    History provided by:  Patient   used: No    Cough  This is a new problem. The current episode started in the past 7 days (12/6/24). The problem has been unchanged. The problem occurs every few minutes. The cough is Productive of sputum. Associated symptoms include ear pain, headaches, a sore throat, shortness of breath and wheezing. Pertinent negatives include no chest pain, chills, eye redness or fever. She has tried OTC cough suppressant (Tylenol and Advil) for the symptoms. The treatment provided no relief.       Past Medical History  Allergies as of 12/09/2024 - Reviewed 12/09/2024   Allergen Reaction Noted    Sulfamethoxazole-trimethoprim Nausea/vomiting 12/06/2023       (Not in a hospital admission)       Past Medical History:   Diagnosis Date    Gastro-esophageal reflux disease without esophagitis 10/23/2019    GERD (gastroesophageal reflux disease)       Past Surgical History:   Procedure Laterality Date    OTHER SURGICAL HISTORY  10/23/2019    No history of surgery        reports that she quit smoking about 13 years ago. Her smoking use included cigarettes. She has never used smokeless tobacco. She reports that she does not drink alcohol and does not use drugs.    Review of Systems  Review of Systems   Constitutional:  Positive for diaphoresis. Negative for chills and fever.   HENT:  Positive for ear pain and sore throat. Negative for sinus pressure and sinus pain.    Eyes:  Negative for pain, discharge and redness.   Respiratory:  Positive for cough, chest tightness, shortness of breath and wheezing.    Cardiovascular:  Negative for chest pain.   Neurological:  Positive for headaches.                                  Objective    Vitals:    12/09/24 0835   BP: 100/72   Pulse: 90   Resp: 18   Temp: 36.2 °C (97.2 °F)   SpO2: 100%    Weight: 83.9 kg (185 lb)     No LMP recorded.    Physical Exam  Vitals reviewed.   Constitutional:       General: She is not in acute distress.     Appearance: She is not ill-appearing.   HENT:      Right Ear: Tympanic membrane and ear canal normal.      Left Ear: Tympanic membrane and ear canal normal.      Nose: Nose normal.      Right Sinus: No maxillary sinus tenderness or frontal sinus tenderness.      Left Sinus: No maxillary sinus tenderness or frontal sinus tenderness.      Mouth/Throat:      Mouth: Mucous membranes are moist.      Pharynx: Posterior oropharyngeal erythema present. No oropharyngeal exudate.   Eyes:      Extraocular Movements: Extraocular movements intact.      Conjunctiva/sclera: Conjunctivae normal.      Pupils: Pupils are equal, round, and reactive to light.   Cardiovascular:      Rate and Rhythm: Normal rate and regular rhythm.      Heart sounds: No murmur heard.     No friction rub.   Pulmonary:      Effort: Pulmonary effort is normal. No respiratory distress.      Breath sounds: No wheezing, rhonchi or rales.   Lymphadenopathy:      Cervical: No cervical adenopathy.   Neurological:      Mental Status: She is alert.         Procedures    Point of Care Test & Imaging Results from this visit  No results found for this visit on 12/09/24.   No results found.    Diagnostic study results (if any) were reviewed by Devon Orozco DO.    Assessment/Plan   Allergies, medications, history, and pertinent labs/EKGs/Imaging reviewed by Devon Orozco DO.     Orders and Diagnoses  There are no diagnoses linked to this encounter.    Medical Admin Record      Patient disposition: Home    Electronically signed by Devon Orozco DO  8:45 AM

## 2024-12-26 ENCOUNTER — OFFICE VISIT (OUTPATIENT)
Dept: PRIMARY CARE | Facility: CLINIC | Age: 44
End: 2024-12-26
Payer: COMMERCIAL

## 2024-12-26 VITALS
DIASTOLIC BLOOD PRESSURE: 47 MMHG | WEIGHT: 190.8 LBS | HEART RATE: 113 BPM | BODY MASS INDEX: 28.26 KG/M2 | TEMPERATURE: 97.3 F | OXYGEN SATURATION: 96 % | SYSTOLIC BLOOD PRESSURE: 101 MMHG | HEIGHT: 69 IN

## 2024-12-26 DIAGNOSIS — U07.1 COVID-19: Primary | ICD-10-CM

## 2024-12-26 DIAGNOSIS — R05.1 ACUTE COUGH: ICD-10-CM

## 2024-12-26 LAB
POC RAPID INFLUENZA A: NEGATIVE
POC RAPID INFLUENZA B: NEGATIVE
POC SARS-COV-2 AG BINAX: ABNORMAL

## 2024-12-26 PROCEDURE — 87811 SARS-COV-2 COVID19 W/OPTIC: CPT | Performed by: FAMILY MEDICINE

## 2024-12-26 PROCEDURE — 1036F TOBACCO NON-USER: CPT | Performed by: FAMILY MEDICINE

## 2024-12-26 PROCEDURE — 3008F BODY MASS INDEX DOCD: CPT | Performed by: FAMILY MEDICINE

## 2024-12-26 PROCEDURE — 87804 INFLUENZA ASSAY W/OPTIC: CPT | Performed by: FAMILY MEDICINE

## 2024-12-26 PROCEDURE — 99213 OFFICE O/P EST LOW 20 MIN: CPT | Performed by: FAMILY MEDICINE

## 2024-12-26 ASSESSMENT — PATIENT HEALTH QUESTIONNAIRE - PHQ9
1. LITTLE INTEREST OR PLEASURE IN DOING THINGS: NOT AT ALL
SUM OF ALL RESPONSES TO PHQ9 QUESTIONS 1 AND 2: 0
2. FEELING DOWN, DEPRESSED OR HOPELESS: NOT AT ALL

## 2024-12-26 NOTE — PROGRESS NOTES
"Subjective   Patient ID: Eleni Watt is a 44 y.o. female who presents for Cough (Symptoms x 1 month went to urgentcare and was told it was viral/This week felt better but rhonda moeller felt awful/Cough, congestion, body aches, sweats, sore throat. ).    HPI   44-year-old female presents complaining congestion, cough, sore throat, body aches and feeling feverish since 12/24  No sob/wheezing.       Has not done a home COVID test.  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  Went to urgent care on 12/9 and was told viral.  Was feeling better until recent symptoms began on 12/24.      Review of Systems  ROS as stated in HPI    Objective   BP (!) 101/47   Pulse (!) 113   Temp 36.3 °C (97.3 °F)   Ht 1.753 m (5' 9\")   Wt 86.5 kg (190 lb 12.8 oz)   SpO2 96%   BMI 28.18 kg/m²     Physical Exam  Constitutional: A&O; NAD  HEENT: conjunctiva normal. EOMI; PERRLA; lids normal; TM's clear;   Neck: supple; No lymphadenopathy   Heart: RRR     Lungs: CTA bilateral    Neuro: No gross neuro deficits     Psych: Judgment, orientation, mood, affect, insight wnl   Assessment/Plan   Problem List Items Addressed This Visit    None  Visit Diagnoses         Codes    COVID-19    -  Primary U07.1    Acute cough     R05.1    Relevant Orders    POCT BinaxNOW Covid-19 Ag Card manually resulted    POCT Influenza A/B manually resulted          Check rapid flu/covid: +covid  Monitor HR/BP  Supportive treatment.  Follow-up if symptoms persist or worsen     The updated CDC recommendations no longer have a definitive isolation period.  They now say that people who have tested positive can return to normal activities when symptoms are improving and they have been fever free for at least 24 hours without any fever reducing medication.   The previous recommendation was to isolate for 5 days from the start of your symptoms so I still encourage people to follow this if possible.  I also encourage people to " take precautions when in public and wear a mask for 10 days, since individuals are typically contagious for about 10 days after the onset of symptoms.

## 2025-02-06 ENCOUNTER — EVALUATION (OUTPATIENT)
Dept: PHYSICAL THERAPY | Facility: CLINIC | Age: 45
End: 2025-02-06
Payer: COMMERCIAL

## 2025-02-06 DIAGNOSIS — M72.2 PLANTAR FASCIITIS: ICD-10-CM

## 2025-02-06 DIAGNOSIS — M25.571 ACUTE RIGHT ANKLE PAIN: Primary | ICD-10-CM

## 2025-02-06 PROCEDURE — 97110 THERAPEUTIC EXERCISES: CPT | Mod: GP

## 2025-02-06 PROCEDURE — 97161 PT EVAL LOW COMPLEX 20 MIN: CPT | Mod: GP

## 2025-02-06 ASSESSMENT — ENCOUNTER SYMPTOMS
DEPRESSION: 0
LOSS OF SENSATION IN FEET: 0
OCCASIONAL FEELINGS OF UNSTEADINESS: 0

## 2025-02-06 ASSESSMENT — COLUMBIA-SUICIDE SEVERITY RATING SCALE - C-SSRS

## 2025-02-06 ASSESSMENT — PATIENT HEALTH QUESTIONNAIRE - PHQ9
SUM OF ALL RESPONSES TO PHQ9 QUESTIONS 1 AND 2: 0
2. FEELING DOWN, DEPRESSED OR HOPELESS: NOT AT ALL
1. LITTLE INTEREST OR PLEASURE IN DOING THINGS: NOT AT ALL

## 2025-02-06 NOTE — PROGRESS NOTES
"Patient Name Eleni Watt  MRN: [id number]  Today's Date: 02/06/25  Time Calculation  Start Time: 0405  Stop Time: 0445  Time Calculation (min): 40 min    Insurance:   Visit #: 1  (per information provided by  pre-cert team)   Insurance Reviewed  Authorization required:  Y  Approved # of visits: needs auth  Authorization/MCR certification date range:      herapy Diagnoses:   Problem List Items Addressed This Visit             ICD-10-CM    Plantar fasciitis M72.2     Other Visit Diagnoses         Codes    Acute right ankle pain    -  Primary M25.571    Relevant Orders    Follow Up In Physical Therapy          General:  Reason for visit: right plantar fasciitis and ankle pain   Referred by: PITO Mari MD appt:  [facility]  Preferred Name:  Eleni  Script:  Eval and treat  Onset Date:  4/1/2024    Subjective:    HPI: Pt fell from 1 step rolling her right ankle last March resulting in a fracture, but no surgery required. Pt has also noted chronic right posterior heel pain and \"plantar fasciitis\" for some time, but over the past few months her inside of right heel now hurts.    Medical Hx/  Precautions: unremarkable    Adult Screening Risk:      Fall Risk:  low    Pain  #:  0-4/10 right lateral ankle; 0-10 right medial calcaneus   Location/Type of pain:  Soreness lateral right ankle, sharp at times to medial calcaneous  What increases pain: Stepping on to foot first thing in the AM, prolonged weightbearing/walking  What decreases pain:  if she rolls her right ankle.   Pt has noted for the past several months that at the front of the right ankle she has a \"squiggly vein\" that showed up and a small area of swelling that she doesn't know what it is from    Patient goal:  lessen pain  Patient is aware of diagnosis and prognosis.    Living Environment:    Social Support:  lives with: family of spouse and 3 teenagers.    Prior Function:  Pt was fully functional prior to the original injury last March. Currently she is fully " functional, except when doing aerobics, she can't do as much jumping.    Function:    A and O x 3  Sleep:  not affected  ADL's: indep  Chores: indep  Driving: indep  Work: indep as   Recreation: likes to exercise  Sitting:   Standing:     Walking: Walks [phone] steps a day    Objective:    Outcome Measures: 72      Gait/stairs: nor formally assessed for gait, but no antalgia or deviations noted. Stairs completed normally    AROM:   Right ankle DF +5; PF 60 IV WFL, EV 18. Left is WNL DF/PF/IV, EV 23    MMT:   DF: Tyrel 5  PF: Tyrel 5  Invertors: R 4+ L 5  Evertors:  R 4 L 5    Flexibility:    Hams:  SLR WFL tyrel  Heelcords: Right +8; L WFL  Hip flexors:  WFL    Palpation: Tender to pressure of right medial calcaneus    Assessment:    Patient with R lateral malleolus soreness and sharp pain to medial calcaneus especially in weightbearing. Pt has right ankle weakness and decreased flexibility. Pt will benefit from skilled PT to address plantar fasciia pain, therapeutic ex, taping for improved overall function.    Problems:    Pain:  _x__  Posture/Body mechanics deficits:  _x__  Decreased knowledge HEP:  __x_  Decreased knowledge of Precautions:  _x__  Activity Limitations:   __x_  ADL's/IADL's/Self-care skills:  ___  ROM/Joint Mobility:  _x__  Strength:  __x_  Decreased functional level:   ___  Flexibility:  __x_  Tenderness/decreased soft tissue mobility:  __x_  Gait/Stairs:  ___  Fall Risk:  _x__  Balance:  __x_  Edema:  ___  Participation restrictions:  ___  Sensory:  ___  Transfers:  ___  Decreased knowledge of brace:   ___  Other:  ___    X Indicates included in problem list    Goals:      ST weeks    Compliant with HEP    Increase R DF AROM to 10, and flexibility to 12    LTG:  by discharge     pain to:  0-2/10 and patient I with self-management of symptoms.     Decreased pain 0-2 right ankle with all activities  Improve LEFS to: 10% limitation of function.    Normal gait on level and  "uneven surfaces community level distances for improved function in the community.    Increase R SLS to 30\".    Increase R ankle strength to WFL for improved function with chores and work duties.      I and compliant with HEP and proper: R LE care.      Rehab potential to achieve the above goals is good.    Patient is aware of diagnosis and prognosis and agrees with established plan of care and goals.    Plan:   Skilled PT 1 x/week for 4-6 weeks (Until goals achieved, maximum rehab potential has been achieved, or patient has plateaued)  for:    Aquatics  _____  CP   __x__  Dry needling  ____  Education  __x__  Electrical Stimulation  __x__  Gait training  ____  HEP  _x___  HP  ____  Manual  __x__  Mechanical Traction  ____  NMR  _x___  Self-care/home management  __x__  Therapeutic Exercise   __x__  Therapeutic Activities  __x__  US  __x__  Work Conditioning  ____  Re-assessment  ____  Other  ____    X Indicates included in treatment plan    PT for Nu-step for functional mobility and soft tissue warm up for more efficient stretching, work on  LE flexibility, right ankle functional strengthening and balance ex    Treatment:    Evaluation:  25 minutes  Manual: 5 minutes  Therapeutic exercise:  10 minutes  Instructions provided for below ex  Access Code: [email or web address]  URL: [email or web address]  Date: 02/06/2025  Prepared by: Sarah Fang    Exercises  - Towel Scrunches  - 1-2 x daily - 7 x weekly - 1 sets - 10 reps  - Heel Raises with Counter Support  - 1-2 x daily - 7 x weekly - 1-2 sets - 10 reps  - Long Sitting Calf Stretch with Strap  - 1-2 x daily - 7 x weekly - 1 sets - 2-3 reps - 30 count hold  - Long Sitting Eccentric Ankle Plantar Flexion with Resistance  - 1-2 x daily - 7 x weekly - 1-2 sets - 10 reps  Education:  poc, anatomy, physiology, posture, body mechanics, safety awareness, HEP.  Preferred learning:  pictures, demonstration.  Demonstrated good understanding.                           "

## 2025-02-21 ENCOUNTER — APPOINTMENT (OUTPATIENT)
Dept: PHYSICAL THERAPY | Facility: CLINIC | Age: 45
End: 2025-02-21
Payer: COMMERCIAL

## 2025-04-15 ENCOUNTER — APPOINTMENT (OUTPATIENT)
Dept: OBSTETRICS AND GYNECOLOGY | Facility: CLINIC | Age: 45
End: 2025-04-15
Payer: COMMERCIAL

## 2025-05-07 ASSESSMENT — ENCOUNTER SYMPTOMS
SHORTNESS OF BREATH: 1
HEADACHES: 1
RHINORRHEA: 1
COUGH: 1

## 2025-05-08 ENCOUNTER — OFFICE VISIT (OUTPATIENT)
Dept: PRIMARY CARE | Facility: CLINIC | Age: 45
End: 2025-05-08
Payer: COMMERCIAL

## 2025-05-08 VITALS
SYSTOLIC BLOOD PRESSURE: 123 MMHG | TEMPERATURE: 98.1 F | BODY MASS INDEX: 28.58 KG/M2 | WEIGHT: 193 LBS | DIASTOLIC BLOOD PRESSURE: 82 MMHG | OXYGEN SATURATION: 94 % | HEART RATE: 94 BPM | HEIGHT: 69 IN

## 2025-05-08 DIAGNOSIS — Z00.00 ROUTINE GENERAL MEDICAL EXAMINATION AT A HEALTH CARE FACILITY: Primary | ICD-10-CM

## 2025-05-08 DIAGNOSIS — Z12.31 ENCOUNTER FOR SCREENING MAMMOGRAM FOR MALIGNANT NEOPLASM OF BREAST: ICD-10-CM

## 2025-05-08 DIAGNOSIS — R05.3 PERSISTENT COUGH: ICD-10-CM

## 2025-05-08 PROCEDURE — 3008F BODY MASS INDEX DOCD: CPT | Performed by: NURSE PRACTITIONER

## 2025-05-08 PROCEDURE — 99213 OFFICE O/P EST LOW 20 MIN: CPT | Performed by: NURSE PRACTITIONER

## 2025-05-08 PROCEDURE — 99396 PREV VISIT EST AGE 40-64: CPT | Performed by: NURSE PRACTITIONER

## 2025-05-08 RX ORDER — DOXYCYCLINE 100 MG/1
100 CAPSULE ORAL 2 TIMES DAILY
Qty: 20 CAPSULE | Refills: 0 | Status: SHIPPED | OUTPATIENT
Start: 2025-05-08 | End: 2025-05-18

## 2025-05-08 RX ORDER — PREDNISONE 20 MG/1
TABLET ORAL
Qty: 15 TABLET | Refills: 0 | Status: SHIPPED | OUTPATIENT
Start: 2025-05-08

## 2025-05-08 ASSESSMENT — ENCOUNTER SYMPTOMS
ADENOPATHY: 0
EYE REDNESS: 0
EYE DISCHARGE: 0
ABDOMINAL PAIN: 0
HEADACHES: 1
CHEST TIGHTNESS: 1
WHEEZING: 1
SORE THROAT: 0
HALLUCINATIONS: 0
PALPITATIONS: 0
DYSURIA: 0
SHORTNESS OF BREATH: 1
VOMITING: 0
FEVER: 0
POLYDIPSIA: 0
BRUISES/BLEEDS EASILY: 0
CHILLS: 0
BLOOD IN STOOL: 0
UNEXPECTED WEIGHT CHANGE: 0
RHINORRHEA: 1
TROUBLE SWALLOWING: 0
WOUND: 0
COUGH: 1
FATIGUE: 1
CONFUSION: 0
HEMATURIA: 0
POLYPHAGIA: 0
APPETITE CHANGE: 0
EYE PAIN: 0
DIZZINESS: 0
FREQUENCY: 0
NECK PAIN: 0
BACK PAIN: 0

## 2025-05-08 NOTE — PATIENT INSTRUCTIONS
Doxycycline-antibiotic  Prednisone-with food  No advil, motrin, ibuprofen, aleve, naproxen or other anti-inflammatories while on prednisone.  Tylenol (acetaminophen) is OK to take.   Chest xray today  Zyrtec 1 a day x 2wks  See pulm dr if cough not fully gone in 2wks    Set up mammogram      Handouts given to pt:  physical handout        Labs- No appt needed:    You can use the lab in our building when fasting. The hrs are: Mon-Fri 7a-330p.  No Saturday hrs. Bring the paper order.   OR   [facility] Mon-Fri 7a-12p. No Saturday hrs. Bring the paper order.  OR   [facility] Mon-Fri 630a-530p or Sat 6:30a-12p. Bring the paper order  OR  [facility] Mon-FrI 7a-5p  [facility] Mon-Fri 730a-4p, Sat  8a-12p  [facility] [address] Mon-Thurs 630a-6p , Fri 630a-4p, Sat 8a-12p  [facility] [address] Mon-Fri 7a-630p and Sat. 7a-3p    Fasting is no food, drink, gum or mints other than water for 12 hrs.   Results will be back in 2-3 business days for most labs. It is always recommended for any orders (labs, xrays, ultrasounds,MRI, ct scan, procedures etc) to check with your insurance provider for expected costs or expenses to you.         You will get your results via phone from my medical assistant if you do not have MyChart.  OR  You will get your results via [employer]    If a result is urgent, I will call to speak to you.    Vaccines:      ---- Tdap-declines      General recommendations:  Exercise-cardio 4-5d/wk 30min each day  Diet-Breakfast-toast (my favorite Raine Coffman Delighful Multigrain or Chucho's Killer Bread Good Seed thin-sliced)/bagel/English muffin-whole wheat flour as a 1st ingredient or cereal/oatmeal/granola bar-fiber 4g or more or protein like eggs or peanut butter; optional veggies  Lunch-protein, 1/2c carb or 2 slices bread, veg 1c  Dinner-protein, fist sized carb, veg 1c  Fruit 2 a day  Dairy 2 a day-milk, soy milk, almond milk, cheese, yogurt, cottage  cheese  Snacks-Protein-hard boiled egg, nuts (walnuts/almonds/pecans/pistachios 1/4c), hummus, beef/deer jerky or meat sticks; vegetable, fruit, dairy-milk(1%, skim, almond, soy)/cheese (not a lot of cheddar)/yogurt (Greek is best-my favorite Dannon Fruit on the Bottom Greek)/cottage cheese 2%; triscuits/ popcorn/wheat thins have a lot of fiber; follow serving size on bag/box/container  increase water  Limit alcohol to 1 drink per day for women and 2 drinks per day for men (1 drink=12oz beer or 5oz wine or 1 1/2oz liquor)  Calcium: 500mg 1 twice a day if age 50 and younger and 600mg 1 twice a day if over age 50 (calcium citrate can be taken without food)  Vitamin D: 800-5000 IU/day  Limit salt to <2300mg a day if age 50 and under and <1500mg a day if over age 50/have high bp or diabetes or kidney disease  Recommend folate for childbearing age women 0.4mg per day (can be found in a multivitamin)  Recommend 18mg/dL of iron a day if age 50 and under and 8mg/dL a day if over age 50; take on an empty stomach at bedtime  Use sunscreen   Wear seatbelt  Recommend safe sex practices: using condoms everytime you have sex, discuss with a new partner about their past partners/history of STDs/drug use, avoid drinking alcohol or using drugs as this increases the chance that you will participate in high-risk sex, for oral sex help protect your mouth by having your partner use a condom (male or female), women should not douche after sex, be aware of your partner's body and your body-look for signs of a sore, blister, rash, or discharge, and have regular exams and periodic tests for STDs.  No distracted driving  No driving when under influence of substances  Wear a seatbelt  Eye dr every 1-2yrs  Dentist every 6-12 mon  Tetanus shot every 10yrs  Recommend flu vaccine in the fall  Appt in 1 year for physical      I will communicate with you via [employer] regarding messages and results. If you need help with this, you can call the  support line at [phone].    IT WAS A PLEASURE TO SEE YOU TODAY. THANK YOU FOR CHOOSING US FOR YOUR HEALTHCARE NEEDS.

## 2025-05-08 NOTE — PROGRESS NOTES
Subjective   Patient ID: Eleni Watt is a 45 y.o. female who presents for Cough.    is pt fasting?  No  Does pt see any other providers than below?  Behmlander, grossman, ochenjele    Any forms to fill out?None  Was her last mammo 12/21/23? no  Does the pt want a tetanus-pertussis shot? No  When did the cough start? November  Any symptoms with the cough? Headache, chest congestion, SOB  Did pt get any treatment for the cough? No, went to Urgent Care twice, once was COVID and other time was Viral  Any other questions or concerns that you want to discuss today?   No        No care team member to display    Cough  This is a chronic problem. The current episode started more than 1 month ago. The problem has been unchanged. The problem occurs every few hours. The cough is Non-productive and productive of sputum. Associated symptoms include headaches, nasal congestion, postnasal drip, rhinorrhea, shortness of breath and wheezing. Pertinent negatives include no chest pain, chills, ear pain, eye redness, fever, rash or sore throat. Nothing aggravates the symptoms. Risk factors for lung disease include animal exposure.     Last labs-12/2023 Sugar (aka glucose), kidney function, liver function and electrolytes in the CMP (comprehensive metabolic panel) were normal.   All cholesterol labs normal.   TSH (thyroid test) was normal.   CBC (complete blood count) was normal which looks at infection and anemia markers.   Food allergy tests normal.  Due for labs- all    Cholesterol   Date Value Ref Range Status   12/11/2023 143 0 - 199 mg/dL Final     Comment:           Age      Desirable   Borderline High   High     0-19 Y     0 - 169       170 - 199     >/= 200    20-24 Y     0 - 189       190 - 224     >/= 225         >24 Y     0 - 199       200 - 239     >/= 240   **All ranges are based on fasting samples. Specific   therapeutic targets will vary based on patient-specific   cardiac risk.    Pediatric guidelines  reference:Pediatrics 2011, 128(S5).Adult guidelines reference: NCEP ATPIII Guidelines,JOLEEN 2001, 258:2486-97    Venipuncture immediately after or during the administration of Metamizole may lead to falsely low results. Testing should be performed immediately prior to Metamizole dosing.   05/31/2019 139 0 - 199 mg/dL Final     Comment:     .      AGE      DESIRABLE   BORDERLINE HIGH   HIGH     0-19 Y     0 - 169       170 - 199     >/= 200    20-24 Y     0 - 189       190 - 224     >/= 225         >24 Y     0 - 199       200 - 239     >/= 240   **All ranges are based on fasting samples. Specific   therapeutic targets will vary based on patient-specific   cardiac risk.  .   Pediatric guidelines reference:Pediatrics 2011, 128(S5).   Adult guidelines reference: NCEP ATPIII Guidelines,     JOLEEN 2001, 258:2486-97  .   Venipuncture immediately after or during the    administration of Metamizole may lead to falsely   low results. Testing should be performed immediately   prior to Metamizole dosing.       Triglycerides   Date Value Ref Range Status   12/11/2023 82 0 - 149 mg/dL Final     Comment:        Age         Desirable   Borderline High   High     Very High   0 D-90 D    19 - 174         ----         ----        ----  91 D- 9 Y     0 -  74        75 -  99     >/= 100      ----    10-19 Y     0 -  89        90 - 129     >/= 130      ----    20-24 Y     0 - 114       115 - 149     >/= 150      ----         >24 Y     0 - 149       150 - 199    200- 499    >/= 500    Venipuncture immediately after or during the administration of Metamizole may lead to falsely low results. Testing should be performed immediately prior to Metamizole dosing.   05/31/2019 42 0 - 149 mg/dL Final     Comment:     .      AGE      DESIRABLE   BORDERLINE HIGH   HIGH     VERY HIGH   0 D-90 D    19 - 174         ----         ----        ----  91 D- 9 Y     0 -  74        75 -  99     >/= 100      ----    10-19 Y     0 -  89        90 - 129     >/=  "130      ----    20-24 Y     0 - 114       115 - 149     >/= 150      ----         >24 Y     0 - 149       150 - 199    200- 499    >/= 500  .   Venipuncture immediately after or during the    administration of Metamizole may lead to falsely   low results. Testing should be performed immediately   prior to Metamizole dosing.       HDL-Cholesterol   Date Value Ref Range Status   12/11/2023 54.3 mg/dL Final     Comment:       Age       Very Low   Low     Normal    High    0-19 Y    < 35      < 40     40-45     ----  20-24 Y    ----     < 40      >45      ----        >24 Y      ----     < 40     40-60      >60       HDL   Date Value Ref Range Status   05/31/2019 62.1 mg/dL Final     Comment:     .      AGE      VERY LOW   LOW     NORMAL    HIGH       0-19 Y       < 35   < 40     40-45     ----    20-24 Y       ----   < 40       >45     ----      >24 Y       ----   < 40     40-60      >60  .       No results found for: \"LDL\"  Thyroid Stimulating Hormone   Date Value Ref Range Status   12/11/2023 1.91 0.44 - 3.98 mIU/L Final     No results found for: \"A1C\"  No components found for: \"POCA1C\"  No results found for: \"ALBUR\"  No components found for: \"POCALBUR\"      Other concerns:cough after eating, chest congestion, sob, wheezing, nasal congestion, postnasal drip, rhinorrhea, HA, fatigue x6mon  Fever gone now  Diarrhea 2wks in jan but gone now  12/2024 uri and then dr bagley 2wks later  Not fully better  No ST, ear pain, fever, chills, vomiting  Selftxt: dayquil, nyquil, aleve, mucinex; nothing in the last 2wks    bps at home- none    ER/urgicare visits in the last year- 12/2024 uri then saw dr bagley 2wks later for covid  Hospitalizations in the last year- none      last Pap- 3/27/24; due 3/2029  H/o abn pap-last yr    FH ovarian, endometrial, cervical, uterine ca-none    Current birth control method-none, had twins,  got vasectomy  No change in contraception desired      last mammo- (40-75/90) 12/21/23     mendoza " ca-none      FH colon ca-none      Exercise- walk dog, does yoga  Diet-2 meals with snacks  Water, energy drinks-not sugar drinks-not daily  Body mass index is 28.5 kg/m².    last eye dr appt- glasses;  4/2025  No vision issues    last dental appt- 2 wks ago    BMs-regular   Sleep-able to fall asleep and stay asleep; no snoring or apnea  no cp, swelling, sob, abd pain, n/v/d/c, blood in stool or black stools  STI testing including hiv (age 15-65) and hep c screening (18-79)-declines        Immunization History   Administered Date(s) Administered    Moderna SARS-CoV-2 Vaccination 04/28/2021, 05/31/2021         fractures in lifetime-ankle  Fh osteoporosis-none    FH heart attack, heart surgery-mat aunt, mgm, mat gr gf  FH stroke-none    The 10-year ASCVD risk score (Arturo SOMMERS, et al., 2019) is: 0.4%    Values used to calculate the score:      Age: 45 years      Sex: Female      Is Non- : No      Diabetic: No      Tobacco smoker: No      Systolic Blood Pressure: 123 mmHg      Is BP treated: No      HDL Cholesterol: 54.3 mg/dL      Total Cholesterol: 143 mg/dL  Coronary Artery Calcium score:  This test is recommended for men 45 or older and women 55 or older without a history of heart disease and have 1 risk factor (high LDL cholesterol, low HDL cholesterol, high blood pressure, smoker (current or past), type 2 diabetes, IBD, lupus, RA, ankylosing spondylitis, psoriasis or family history of  heart disease <55yrs in dad, brother or child or <65yrs in mom, sister, or child.)       Review of Systems   Constitutional:  Positive for fatigue. Negative for appetite change, chills, fever and unexpected weight change.   HENT:  Positive for congestion, postnasal drip and rhinorrhea. Negative for ear pain, sore throat and trouble swallowing.    Eyes:  Negative for pain, discharge and redness.   Respiratory:  Positive for cough, chest tightness, shortness of breath and wheezing.    Cardiovascular:  Negative  for chest pain and palpitations.   Gastrointestinal:  Negative for abdominal pain, blood in stool and vomiting.   Endocrine: Negative for polydipsia, polyphagia and polyuria.   Genitourinary:  Negative for dysuria, frequency, hematuria and urgency.   Musculoskeletal:  Negative for back pain and neck pain.   Skin:  Negative for rash and wound.   Allergic/Immunologic: Negative for immunocompromised state.   Neurological:  Positive for headaches. Negative for dizziness and syncope.   Hematological:  Negative for adenopathy. Does not bruise/bleed easily.   Psychiatric/Behavioral:  Negative for confusion and hallucinations.        Objective   Visit Vitals  /82   Pulse 94   Temp 36.7 °C (98.1 °F) (Temporal)        BP Readings from Last 3 Encounters:   05/08/25 123/82   12/26/24 (!) 101/47   12/09/24 100/72     Wt Readings from Last 3 Encounters:   05/08/25 87.5 kg (193 lb)   12/26/24 86.5 kg (190 lb 12.8 oz)   12/09/24 83.9 kg (185 lb)           Physical Exam  Constitutional:       General: She is not in acute distress.     Appearance: Normal appearance. She is not ill-appearing.   HENT:      Head: Normocephalic.      Right Ear: Tympanic membrane, ear canal and external ear normal.      Left Ear: Tympanic membrane, ear canal and external ear normal.      Nose: Nose normal.      Mouth/Throat:      Mouth: Mucous membranes are moist.      Pharynx: Oropharynx is clear.   Eyes:      Extraocular Movements: Extraocular movements intact.      Conjunctiva/sclera: Conjunctivae normal.      Pupils: Pupils are equal, round, and reactive to light.   Cardiovascular:      Rate and Rhythm: Normal rate and regular rhythm.      Heart sounds: Normal heart sounds. No murmur heard.  Pulmonary:      Effort: Pulmonary effort is normal. No respiratory distress.      Breath sounds: Normal breath sounds. No wheezing, rhonchi or rales.   Abdominal:      General: Bowel sounds are normal.      Palpations: Abdomen is soft. There is no mass.       Tenderness: There is no abdominal tenderness.   Musculoskeletal:         General: No swelling or tenderness. Normal range of motion.      Cervical back: Normal range of motion and neck supple.      Right lower leg: No edema.      Left lower leg: No edema.   Skin:     General: Skin is warm.      Findings: No rash.   Neurological:      General: No focal deficit present.      Mental Status: She is alert and oriented to person, place, and time.      Cranial Nerves: No cranial nerve deficit.      Motor: No weakness.   Psychiatric:         Mood and Affect: Mood normal.         Behavior: Behavior normal.         Assessment/Plan   Diagnoses and all orders for this visit:  Routine general medical examination at a health care facility  -     Comprehensive Metabolic Panel; Future  -     CBC and Auto Differential; Future  -     Lipid Panel; Future  -     TSH with reflex to Free T4 if abnormal; Future  BMI 28.0-28.9,adult  Encounter for screening mammogram for malignant neoplasm of breast  -     BI mammo bilateral screening tomosynthesis; Future  Persistent cough  -     Referral to Pulmonology; Future  -     XR chest 2 views; Future  -     doxycycline (Vibramycin) 100 mg capsule; Take 1 capsule (100 mg) by mouth 2 times a day for 10 days. Take with at least 8 ounces (large glass) of water, do not lie down for 30 minutes after  -     predniSONE (Deltasone) 20 mg tablet; Take 2 tabs daily x 5d then 1 tab daily x 3d then 1/2 tab daily x 3d with food  Other orders  -     Follow Up In Primary Care - Health Maintenance; Future

## 2025-06-10 ENCOUNTER — APPOINTMENT (OUTPATIENT)
Facility: CLINIC | Age: 45
End: 2025-06-10
Payer: COMMERCIAL

## 2025-08-02 ENCOUNTER — OFFICE VISIT (OUTPATIENT)
Dept: URGENT CARE | Age: 45
End: 2025-08-02
Payer: COMMERCIAL

## 2025-08-02 VITALS
OXYGEN SATURATION: 98 % | RESPIRATION RATE: 18 BRPM | WEIGHT: 188.4 LBS | TEMPERATURE: 98 F | HEIGHT: 69 IN | HEART RATE: 76 BPM | SYSTOLIC BLOOD PRESSURE: 114 MMHG | BODY MASS INDEX: 27.91 KG/M2 | DIASTOLIC BLOOD PRESSURE: 79 MMHG
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
## 2025-08-02 DIAGNOSIS — L03.116 LEFT LEG CELLULITIS: ICD-10-CM

## 2025-08-02 RX ORDER — DOXYCYCLINE 100 MG/1
100 CAPSULE ORAL 2 TIMES DAILY
Qty: 20 CAPSULE | Refills: 0 | Status: SHIPPED | OUTPATIENT
Start: 2025-08-02 | End: 2025-08-12

## 2025-08-02 ASSESSMENT — ENCOUNTER SYMPTOMS
MYALGIAS: 0
CHILLS: 0
HEADACHES: 0
FATIGUE: 0
FEVER: 0

## 2025-08-02 NOTE — PROGRESS NOTES
"Subjective   Patient ID: Eleni Watt is a 45 y.o. female. They present today with a chief complaint of Insect Bite (Left foot, red and swollen. ).    History of Present Illness  Patient presents with concern for bug bite to left lower leg. States she was walking her dog 4-5 days ago when she felt a sharp sting to the left leg/ankle. Since then she has had pain, sensitivity, itching to the area with increased redness/swelling. Endorses no treatments.           Past Medical History  Allergies as of 08/02/2025 - Reviewed 08/02/2025   Allergen Reaction Noted    Sulfamethoxazole-trimethoprim Nausea/vomiting 12/06/2023       Prescriptions Prior to Admission[1]     Medical History[2]    Surgical History[3]     reports that she quit smoking about 14 years ago. Her smoking use included cigarettes. She has never used smokeless tobacco. She reports that she does not drink alcohol and does not use drugs.    Review of Systems  Review of Systems   Constitutional:  Negative for chills, fatigue and fever.   Musculoskeletal:  Negative for myalgias.   Skin:  Positive for rash.   Neurological:  Negative for headaches.                                  Objective    Vitals:    08/02/25 1004   BP: 114/79   BP Location: Left arm   Patient Position: Sitting   BP Cuff Size: Adult   Pulse: 76   Resp: 18   Temp: 36.7 °C (98 °F)   TempSrc: Oral   SpO2: 98%   Weight: 85.5 kg (188 lb 6.4 oz)   Height: 1.753 m (5' 9\")     Patient's last menstrual period was 07/26/2025 (approximate).    Physical Exam  Vitals reviewed.   Constitutional:       Appearance: Normal appearance.     Cardiovascular:      Rate and Rhythm: Normal rate and regular rhythm.   Pulmonary:      Effort: Pulmonary effort is normal.      Breath sounds: Normal breath sounds.     Skin:     General: Skin is warm.      Comments: Area of erythema approx 6 cm around on the left anterior LLE. Small ulceration noted distally, no visible foreign body/stinger noted. No purulent " discharge, serous discharge, blood. TTP, slight induration. No streaking     Neurological:      Mental Status: She is alert.         Procedures    Point of Care Test & Imaging Results from this visit  No results found for this visit on 08/02/25.   Imaging  No results found.    Cardiology, Vascular, and Other Imaging  No other imaging results found for the past 2 days      Diagnostic study results (if any) were reviewed by JESICA Darden.    Assessment/Plan   Allergies, medications, history, and pertinent labs/EKGs/Imaging reviewed by JESICA Darden.     Medical Decision Making  Continue supportive measures, and symptom management. Provided prescription for antibiotic. F/u if s/s increase or worsen.     At time of discharge patient was clinically well-appearing and HDS for outpatient management. The patient and/or family was educated regarding diagnosis, supportive care, OTC and Rx medications. The patient and/or family was given the opportunity to ask questions prior to discharge.  They verbalized understanding of my discussion of the plans for treatment, expected course, indications to return to  or seek further evaluation in ED, and the need for timely follow up as directed.   They were provided with a work/school excuse if requested.      Orders and Diagnoses  Diagnoses and all orders for this visit:  Bug bite, initial encounter  -     doxycycline (Vibramycin) 100 mg capsule; Take 1 capsule (100 mg) by mouth 2 times a day for 10 days. Take with at least 8 ounces (large glass) of water, do not lie down for 30 minutes after  Left leg cellulitis  -     doxycycline (Vibramycin) 100 mg capsule; Take 1 capsule (100 mg) by mouth 2 times a day for 10 days. Take with at least 8 ounces (large glass) of water, do not lie down for 30 minutes after      Medical Admin Record      Patient disposition: Home    Electronically signed by JESICA Darden  10:27 AM           [1] (Not in a hospital  admission)   [2]   Past Medical History:  Diagnosis Date    Gastro-esophageal reflux disease without esophagitis 10/23/2019    GERD (gastroesophageal reflux disease)   [3]   Past Surgical History:  Procedure Laterality Date    OTHER SURGICAL HISTORY  10/23/2019    No history of surgery

## 2025-08-21 ENCOUNTER — LAB (OUTPATIENT)
Dept: LAB | Facility: HOSPITAL | Age: 45
End: 2025-08-21
Payer: COMMERCIAL

## 2025-08-21 LAB — COTININE UR QL SCN: NEGATIVE

## 2025-09-09 ENCOUNTER — APPOINTMENT (OUTPATIENT)
Facility: CLINIC | Age: 45
End: 2025-09-09
Payer: COMMERCIAL

## 2026-05-11 ENCOUNTER — APPOINTMENT (OUTPATIENT)
Dept: PRIMARY CARE | Facility: CLINIC | Age: 46
End: 2026-05-11
Payer: COMMERCIAL